# Patient Record
Sex: MALE | Race: WHITE | NOT HISPANIC OR LATINO | Employment: FULL TIME | URBAN - METROPOLITAN AREA
[De-identification: names, ages, dates, MRNs, and addresses within clinical notes are randomized per-mention and may not be internally consistent; named-entity substitution may affect disease eponyms.]

---

## 2017-03-31 ENCOUNTER — OFFICE VISIT (OUTPATIENT)
Dept: FAMILY MEDICINE CLINIC | Facility: CLINIC | Age: 52
End: 2017-03-31

## 2017-03-31 VITALS
BODY MASS INDEX: 36.91 KG/M2 | SYSTOLIC BLOOD PRESSURE: 140 MMHG | TEMPERATURE: 97.4 F | DIASTOLIC BLOOD PRESSURE: 88 MMHG | HEIGHT: 70 IN | HEART RATE: 76 BPM | WEIGHT: 257.8 LBS | RESPIRATION RATE: 20 BRPM

## 2017-03-31 DIAGNOSIS — J40 BRONCHITIS: Primary | ICD-10-CM

## 2017-03-31 PROCEDURE — 99213 OFFICE O/P EST LOW 20 MIN: CPT | Performed by: FAMILY MEDICINE

## 2017-03-31 RX ORDER — METHYLPREDNISOLONE 4 MG/1
TABLET ORAL
Qty: 21 TABLET | Refills: 0 | Status: SHIPPED | OUTPATIENT
Start: 2017-03-31 | End: 2019-05-17

## 2017-03-31 RX ORDER — AZITHROMYCIN 250 MG/1
TABLET, FILM COATED ORAL
Qty: 6 TABLET | Refills: 0 | Status: SHIPPED | OUTPATIENT
Start: 2017-03-31 | End: 2019-05-17

## 2017-03-31 NOTE — PROGRESS NOTES
Subjective   Audie Paul is a 52 y.o. male.     History of Present Illness   Upper Respiratory Infection  Patient complains of symptoms of a URI, possible sinusitis. Symptoms include congestion, lightheadedness, no  fever and non productive cough. Onset of symptoms was 4 days ago, and has been gradually worsening since that time. Treatment to date: DayQuil and NyQuil .    The following portions of the patient's history were reviewed and updated as appropriate: allergies, current medications, past family history, past medical history, past social history, past surgical history and problem list.    Review of Systems   Constitutional: Negative for chills and fever.   HENT: Negative for congestion, postnasal drip and sinus pressure.    Respiratory: Positive for cough. Negative for chest tightness and wheezing.    Cardiovascular: Negative for chest pain.   Gastrointestinal: Negative for diarrhea, nausea and vomiting.   Musculoskeletal: Negative for arthralgias and myalgias.   Skin: Negative for rash.   Neurological: Negative for dizziness and headaches.       Objective   Physical Exam   Constitutional: He is oriented to person, place, and time. He appears well-developed and well-nourished.   HENT:   Head: Normocephalic and atraumatic.   Right Ear: Hearing, tympanic membrane, external ear and ear canal normal.   Left Ear: Hearing, tympanic membrane, external ear and ear canal normal.   Nose: Nose normal. Right sinus exhibits no maxillary sinus tenderness and no frontal sinus tenderness. Left sinus exhibits no maxillary sinus tenderness and no frontal sinus tenderness.   Mouth/Throat: Uvula is midline, oropharynx is clear and moist and mucous membranes are normal.   Eyes: Conjunctivae and EOM are normal.   Neck: Normal range of motion. Neck supple.   Cardiovascular: Normal rate, regular rhythm and normal heart sounds.    Pulmonary/Chest: Effort normal and breath sounds normal. No respiratory distress. He has no  wheezes.   Dry cough     Musculoskeletal: He exhibits no deformity.   Lymphadenopathy:     He has no cervical adenopathy.   Neurological: He is alert and oriented to person, place, and time. No cranial nerve deficit.   Skin: Skin is warm and dry.   Psychiatric: He has a normal mood and affect. His behavior is normal.   Nursing note and vitals reviewed.      Assessment/Plan   Audie was seen today for cough & chest congestion.    Diagnoses and all orders for this visit:    Bronchitis  -     MethylPREDNISolone (MEDROL, ESTELLA,) 4 MG tablet; Take as directed on package instructions.  -     azithromycin (ZITHROMAX) 250 MG tablet; Take 2 tablets the first day, then 1 tablet daily for 4 days.      Bronchitis treated with steroid and Zpak  Advised him to use good hand hygiene and cough mouth while coughing to prevent spread of infection

## 2017-03-31 NOTE — PATIENT INSTRUCTIONS
Go to the nearest ER or return to clinic if symptoms worsen, fever/chill develop      Acute Bronchitis  Bronchitis is when the airways that extend from the windpipe into the lungs get red, puffy, and painful (inflamed). Bronchitis often causes thick spit (mucus) to develop. This leads to a cough. A cough is the most common symptom of bronchitis.  In acute bronchitis, the condition usually begins suddenly and goes away over time (usually in 2 weeks). Smoking, allergies, and asthma can make bronchitis worse. Repeated episodes of bronchitis may cause more lung problems.  HOME CARE  · Rest.  · Drink enough fluids to keep your pee (urine) clear or pale yellow (unless you need to limit fluids as told by your doctor).  · Only take over-the-counter or prescription medicines as told by your doctor.  · Avoid smoking and secondhand smoke. These can make bronchitis worse. If you are a smoker, think about using nicotine gum or skin patches. Quitting smoking will help your lungs heal faster.  · Reduce the chance of getting bronchitis again by:    Washing your hands often.    Avoiding people with cold symptoms.    Trying not to touch your hands to your mouth, nose, or eyes.  · Follow up with your doctor as told.  GET HELP IF:  Your symptoms do not improve after 1 week of treatment. Symptoms include:  · Cough.  · Fever.  · Coughing up thick spit.  · Body aches.  · Chest congestion.  · Chills.  · Shortness of breath.  · Sore throat.  GET HELP RIGHT AWAY IF:   · You have an increased fever.  · You have chills.  · You have severe shortness of breath.  · You have bloody thick spit (sputum).  · You throw up (vomit) often.  · You lose too much body fluid (dehydration).  · You have a severe headache.  · You faint.  MAKE SURE YOU:   · Understand these instructions.  · Will watch your condition.  · Will get help right away if you are not doing well or get worse.     This information is not intended to replace advice given to you by your  health care provider. Make sure you discuss any questions you have with your health care provider.     Document Released: 06/05/2009 Document Revised: 08/20/2014 Document Reviewed: 06/10/2014  Elsevier Interactive Patient Education ©2016 [employer]

## 2019-05-17 ENCOUNTER — RESULTS ENCOUNTER (OUTPATIENT)
Dept: FAMILY MEDICINE CLINIC | Facility: CLINIC | Age: 54
End: 2019-05-17

## 2019-05-17 ENCOUNTER — OFFICE VISIT (OUTPATIENT)
Dept: FAMILY MEDICINE CLINIC | Facility: CLINIC | Age: 54
End: 2019-05-17

## 2019-05-17 VITALS
SYSTOLIC BLOOD PRESSURE: 162 MMHG | HEIGHT: 71 IN | RESPIRATION RATE: 16 BRPM | DIASTOLIC BLOOD PRESSURE: 102 MMHG | TEMPERATURE: 97.7 F | BODY MASS INDEX: 36.82 KG/M2 | WEIGHT: 263 LBS | HEART RATE: 72 BPM

## 2019-05-17 DIAGNOSIS — Z12.11 SCREEN FOR COLON CANCER: ICD-10-CM

## 2019-05-17 DIAGNOSIS — Z00.00 WELL ADULT EXAM: Primary | ICD-10-CM

## 2019-05-17 DIAGNOSIS — I10 ESSENTIAL HYPERTENSION: ICD-10-CM

## 2019-05-17 DIAGNOSIS — R13.14 PHARYNGOESOPHAGEAL DYSPHAGIA: ICD-10-CM

## 2019-05-17 DIAGNOSIS — Z12.5 SCREENING FOR PROSTATE CANCER: ICD-10-CM

## 2019-05-17 PROCEDURE — 99396 PREV VISIT EST AGE 40-64: CPT | Performed by: FAMILY MEDICINE

## 2019-05-17 RX ORDER — LISINOPRIL AND HYDROCHLOROTHIAZIDE 25; 20 MG/1; MG/1
1 TABLET ORAL DAILY
Qty: 30 TABLET | Refills: 5 | Status: SHIPPED | OUTPATIENT
Start: 2019-05-17 | End: 2021-09-10

## 2019-05-18 LAB
ALBUMIN SERPL-MCNC: 4.3 G/DL (ref 3.5–5.2)
ALBUMIN/GLOB SERPL: 1.7 G/DL
ALP SERPL-CCNC: 85 U/L (ref 39–117)
ALT SERPL-CCNC: 23 U/L (ref 1–41)
AST SERPL-CCNC: 17 U/L (ref 1–40)
BASOPHILS # BLD AUTO: 0.06 10*3/MM3 (ref 0–0.2)
BASOPHILS NFR BLD AUTO: 0.7 % (ref 0–1.5)
BILIRUB SERPL-MCNC: 0.2 MG/DL (ref 0.2–1.2)
BUN SERPL-MCNC: 11 MG/DL (ref 6–20)
BUN/CREAT SERPL: 13.4 (ref 7–25)
CALCIUM SERPL-MCNC: 9.8 MG/DL (ref 8.6–10.5)
CHLORIDE SERPL-SCNC: 105 MMOL/L (ref 98–107)
CHOLEST SERPL-MCNC: 161 MG/DL (ref 0–200)
CO2 SERPL-SCNC: 27.8 MMOL/L (ref 22–29)
CREAT SERPL-MCNC: 0.82 MG/DL (ref 0.76–1.27)
EOSINOPHIL # BLD AUTO: 0.24 10*3/MM3 (ref 0–0.4)
EOSINOPHIL NFR BLD AUTO: 2.7 % (ref 0.3–6.2)
ERYTHROCYTE [DISTWIDTH] IN BLOOD BY AUTOMATED COUNT: 14.1 % (ref 12.3–15.4)
GLOBULIN SER CALC-MCNC: 2.5 GM/DL
GLUCOSE SERPL-MCNC: 108 MG/DL (ref 65–99)
HCT VFR BLD AUTO: 50.1 % (ref 37.5–51)
HDLC SERPL-MCNC: 40 MG/DL (ref 40–60)
HGB BLD-MCNC: 15.7 G/DL (ref 13–17.7)
IMM GRANULOCYTES # BLD AUTO: 0.05 10*3/MM3 (ref 0–0.05)
IMM GRANULOCYTES NFR BLD AUTO: 0.6 % (ref 0–0.5)
LDLC SERPL CALC-MCNC: 98 MG/DL (ref 0–100)
LYMPHOCYTES # BLD AUTO: 1.97 10*3/MM3 (ref 0.7–3.1)
LYMPHOCYTES NFR BLD AUTO: 22.5 % (ref 19.6–45.3)
MCH RBC QN AUTO: 28.1 PG (ref 26.6–33)
MCHC RBC AUTO-ENTMCNC: 31.3 G/DL (ref 31.5–35.7)
MCV RBC AUTO: 89.6 FL (ref 79–97)
MONOCYTES # BLD AUTO: 0.77 10*3/MM3 (ref 0.1–0.9)
MONOCYTES NFR BLD AUTO: 8.8 % (ref 5–12)
NEUTROPHILS # BLD AUTO: 5.65 10*3/MM3 (ref 1.7–7)
NEUTROPHILS NFR BLD AUTO: 64.7 % (ref 42.7–76)
NRBC BLD AUTO-RTO: 0 /100 WBC (ref 0–0.2)
PLATELET # BLD AUTO: 255 10*3/MM3 (ref 140–450)
POTASSIUM SERPL-SCNC: 4.7 MMOL/L (ref 3.5–5.2)
PROT SERPL-MCNC: 6.8 G/DL (ref 6–8.5)
PSA SERPL-MCNC: 0.46 NG/ML (ref 0–4)
RBC # BLD AUTO: 5.59 10*6/MM3 (ref 4.14–5.8)
SODIUM SERPL-SCNC: 142 MMOL/L (ref 136–145)
TRIGL SERPL-MCNC: 117 MG/DL (ref 0–150)
VLDLC SERPL CALC-MCNC: 23.4 MG/DL
WBC # BLD AUTO: 8.74 10*3/MM3 (ref 3.4–10.8)

## 2019-05-28 ENCOUNTER — TELEPHONE (OUTPATIENT)
Dept: FAMILY MEDICINE CLINIC | Facility: CLINIC | Age: 54
End: 2019-05-28

## 2019-05-28 NOTE — TELEPHONE ENCOUNTER
----- Message from Regina Celis sent at 5/28/2019 10:21 AM EDT -----  Contact: DR LUNDY   PATIENT IS HAVING FROM THE lisinopril-hydrochlorothiazide (PRINZIDE,ZESTORETIC) 20-25 MG per tablet. PATIENT FEELS VERY LIGHTHEADED AND VERY NAUSEOUS WHEN HE EATS. IT HAS HELPED WITH HIS BP. WHAT SHOULD HE DO?  [phone]

## 2020-02-13 ENCOUNTER — OFFICE VISIT (OUTPATIENT)
Dept: FAMILY MEDICINE CLINIC | Facility: CLINIC | Age: 55
End: 2020-02-13

## 2020-02-13 VITALS
TEMPERATURE: 97.7 F | RESPIRATION RATE: 18 BRPM | WEIGHT: 268.6 LBS | HEART RATE: 80 BPM | HEIGHT: 71 IN | BODY MASS INDEX: 37.6 KG/M2 | DIASTOLIC BLOOD PRESSURE: 86 MMHG | SYSTOLIC BLOOD PRESSURE: 126 MMHG

## 2020-02-13 DIAGNOSIS — R52 BODY ACHES: ICD-10-CM

## 2020-02-13 DIAGNOSIS — J02.9 SORE THROAT: ICD-10-CM

## 2020-02-13 DIAGNOSIS — J06.9 URI, ACUTE: Primary | ICD-10-CM

## 2020-02-13 LAB
EXPIRATION DATE: NORMAL
EXPIRATION DATE: NORMAL
FLUAV AG NPH QL: NEGATIVE
FLUBV AG NPH QL: NEGATIVE
INTERNAL CONTROL: NORMAL
INTERNAL CONTROL: NORMAL
Lab: NORMAL
Lab: NORMAL
S PYO AG THROAT QL: NEGATIVE

## 2020-02-13 PROCEDURE — 87880 STREP A ASSAY W/OPTIC: CPT | Performed by: PHYSICIAN ASSISTANT

## 2020-02-13 PROCEDURE — 99213 OFFICE O/P EST LOW 20 MIN: CPT | Performed by: PHYSICIAN ASSISTANT

## 2020-02-13 PROCEDURE — 87804 INFLUENZA ASSAY W/OPTIC: CPT | Performed by: PHYSICIAN ASSISTANT

## 2020-02-13 NOTE — PROGRESS NOTES
"Subjective   Audie Paul is a 54 y.o. male.   Chief Complaint   Patient presents with   • URI     congestion, cough, some aching, Sore throat, drainage, no fever. Started yesterday. Cold chills last night.       History of Present Illness   Pt presents with CC of cough, congestion, aching, sore throat, drainage, chills. Symptoms started last night   No fever.   No known sick contacts   Did not get flu shot this year     The following portions of the patient's history were reviewed and updated as appropriate: allergies, current medications, past family history, past medical history, past social history, past surgical history and problem list.    Review of Systems   Constitutional: Positive for chills and fatigue. Negative for diaphoresis and fever.   HENT: Positive for congestion, postnasal drip and sore throat. Negative for ear discharge, ear pain, hearing loss, nosebleeds, sinus pressure and sneezing.    Eyes: Negative.    Respiratory: Positive for cough. Negative for chest tightness, shortness of breath and wheezing.    Cardiovascular: Negative.  Negative for chest pain, palpitations and leg swelling.   Gastrointestinal: Negative for abdominal distention, abdominal pain, blood in stool, constipation, diarrhea, nausea and vomiting.   Genitourinary: Negative.  Negative for difficulty urinating, dysuria, flank pain, frequency, hematuria and urgency.   Musculoskeletal: Positive for myalgias. Negative for arthralgias, back pain, gait problem, joint swelling, neck pain and neck stiffness.   Skin: Negative.  Negative for color change, pallor, rash and wound.   Neurological: Negative for syncope, weakness, numbness and headaches.       Objective    Blood pressure 126/86, pulse 80, temperature 97.7 °F (36.5 °C), resp. rate 18, height 179.1 cm (70.5\"), weight 122 kg (268 lb 9.6 oz).     Physical Exam   Constitutional: He is oriented to person, place, and time. He appears well-developed and well-nourished.   HENT: "   Head: Normocephalic and atraumatic.   Right Ear: Tympanic membrane, external ear and ear canal normal.   Left Ear: Tympanic membrane, external ear and ear canal normal.   Nose: Mucosal edema and rhinorrhea present. Right sinus exhibits no maxillary sinus tenderness and no frontal sinus tenderness. Left sinus exhibits no maxillary sinus tenderness and no frontal sinus tenderness.   Mouth/Throat: Oropharynx is clear and moist. No oropharyngeal exudate, posterior oropharyngeal edema or posterior oropharyngeal erythema.   Eyes: Conjunctivae are normal.   Neck: Normal range of motion. Neck supple. No tracheal deviation present. No thyromegaly present.   Cardiovascular: Normal rate, regular rhythm and normal heart sounds.   Pulmonary/Chest: Effort normal and breath sounds normal. No respiratory distress. He has no wheezes. He has no rales. He exhibits no tenderness.   Lymphadenopathy:     He has no cervical adenopathy.   Neurological: He is alert and oriented to person, place, and time.   Skin: Skin is warm and dry.   Psychiatric: He has a normal mood and affect. His behavior is normal. Judgment and thought content normal.   Nursing note and vitals reviewed.      Assessment/Plan   Audie was seen today for uri.    Diagnoses and all orders for this visit:    URI, acute    Sore throat  -     POCT Influenza A/B    Body aches  -     POCT rapid strep A    flu A and B negative. RST negative. Pt aware. Symptoms likely viral. Symptomatic treatment as discussed. Call if not improving. Pt agrees.

## 2020-02-17 NOTE — PROGRESS NOTES
I have reviewed the notes, assessments, and/or procedures performed by JOSI Saldana, I concur with her/his documentation of Audie Paul.

## 2021-09-10 ENCOUNTER — OFFICE VISIT (OUTPATIENT)
Dept: FAMILY MEDICINE CLINIC | Facility: CLINIC | Age: 56
End: 2021-09-10

## 2021-09-10 VITALS
DIASTOLIC BLOOD PRESSURE: 84 MMHG | BODY MASS INDEX: 38.94 KG/M2 | RESPIRATION RATE: 18 BRPM | SYSTOLIC BLOOD PRESSURE: 124 MMHG | TEMPERATURE: 98.6 F | WEIGHT: 272 LBS | HEIGHT: 70 IN | HEART RATE: 78 BPM

## 2021-09-10 DIAGNOSIS — M77.8 LEFT SHOULDER TENDONITIS: ICD-10-CM

## 2021-09-10 DIAGNOSIS — M25.512 ACUTE PAIN OF LEFT SHOULDER: Primary | ICD-10-CM

## 2021-09-10 PROCEDURE — 99213 OFFICE O/P EST LOW 20 MIN: CPT | Performed by: FAMILY MEDICINE

## 2021-09-10 RX ORDER — NAPROXEN 500 MG/1
500 TABLET ORAL 2 TIMES DAILY WITH MEALS
Qty: 60 TABLET | Refills: 0 | Status: SHIPPED | OUTPATIENT
Start: 2021-09-10 | End: 2022-06-21

## 2021-09-10 NOTE — PROGRESS NOTES
Subjective   Audie Paul is a 56 y.o. male.     History of Present Illness     He has had some left shoulder pain  End of July he was at work and he lifted his arm up and he felt a pop  Since that time he has had left shoulder pain  He went to [facility] and had an XR done of his left shoulder.  XR looked like arthritis  He is going to start PT  He is not on any NSAIDs  Pain is nonstop and hurts all the time      The following portions of the patient's history were reviewed and updated as appropriate: allergies, current medications, past family history, past medical history, past social history, past surgical history and problem list.    Review of Systems   Constitutional: Negative.    Musculoskeletal:        Hpi       Objective   Physical Exam  Vitals and nursing note reviewed.   Constitutional:       General: He is not in acute distress.     Appearance: Normal appearance. He is well-developed.   Cardiovascular:      Rate and Rhythm: Normal rate and regular rhythm.      Heart sounds: Normal heart sounds.   Pulmonary:      Effort: Pulmonary effort is normal.      Breath sounds: Normal breath sounds.   Musculoskeletal:        Arms:    Neurological:      Mental Status: He is alert and oriented to person, place, and time.   Psychiatric:         Mood and Affect: Mood normal.         Behavior: Behavior normal.         Thought Content: Thought content normal.         Judgment: Judgment normal.         Assessment/Plan   Diagnoses and all orders for this visit:    1. Acute pain of left shoulder (Primary)    2. Left shoulder tendonitis  -     naproxen (Naprosyn) 500 MG tablet; Take 1 tablet by mouth 2 (Two) Times a Day With Meals.  Dispense: 60 tablet; Refill: 0    likely tendonitis, will work on PT for shoulder pain as well as NSAID.  Consider ortho if not improving in the next 2 weeks.  Will request XR results

## 2022-06-21 ENCOUNTER — OFFICE VISIT (OUTPATIENT)
Dept: FAMILY MEDICINE CLINIC | Facility: CLINIC | Age: 57
End: 2022-06-21

## 2022-06-21 VITALS
TEMPERATURE: 98.4 F | HEART RATE: 78 BPM | BODY MASS INDEX: 38.65 KG/M2 | WEIGHT: 270 LBS | RESPIRATION RATE: 18 BRPM | DIASTOLIC BLOOD PRESSURE: 92 MMHG | SYSTOLIC BLOOD PRESSURE: 144 MMHG | HEIGHT: 70 IN

## 2022-06-21 DIAGNOSIS — I10 ESSENTIAL HYPERTENSION: Primary | ICD-10-CM

## 2022-06-21 DIAGNOSIS — M25.561 ACUTE PAIN OF RIGHT KNEE: ICD-10-CM

## 2022-06-21 DIAGNOSIS — Z12.5 PROSTATE CANCER SCREENING: ICD-10-CM

## 2022-06-21 DIAGNOSIS — Z12.11 COLON CANCER SCREENING: ICD-10-CM

## 2022-06-21 PROCEDURE — 99214 OFFICE O/P EST MOD 30 MIN: CPT | Performed by: FAMILY MEDICINE

## 2022-06-21 RX ORDER — LISINOPRIL 10 MG/1
10 TABLET ORAL DAILY
Qty: 90 TABLET | Refills: 0 | Status: SHIPPED | OUTPATIENT
Start: 2022-06-21 | End: 2022-09-16

## 2022-06-21 NOTE — PROGRESS NOTES
"Chief Complaint  Hypertension and Knee Pain (Right )    Subjective          Audie Paul presents to [facility]  History of Present Illness    Essential hypertension  The patient reports that he has had issues with elevated blood pressure over the years. He has been told many times that his blood pressure is high including most recently on 2022 when he had a left rotator cuff repair. The patient was at  earlier this morning where his blood pressure was 161/83 mmHg.     He denies any headaches, chest pain, or shortness of breath other than with exertion. He states that he is still off work right now from his rotator cuff surgery. He does have a family history of high blood pressure in his father who  a while back; however, he was 80 years old. He states that his mother is in perfect health. He denies smoking but endorses caffeine in the form of 4 to 5 Cokes a day. He denies energy drinks. He denies illicit drugs such as cocaine or methamphetamine. The patient has been on lisinopril/hydrochlorothiazide in the past, which he states made him feel \"weird.\" He does recall having a cough with that regimen.     Right knee pain  The patient reports a knot on his right knee that has been present for many years; however, he denies any pain or difficulty with it. He states that over the last couple of weeks, he has had a sensation of something moving in the posterior knee with occasional pain and associated popping with going down stairs. He has applied ice to the area. He denies any falls or instability. He does have pain with prolonged walking.     Review of Systems   A review of systems was performed, and the pertinent positives are noted in the HPI.     Objective       Vital Signs:   /92   Pulse 78   Temp 98.4 °F (36.9 °C)   Resp 18   Ht 177.8 cm (70\")   Wt 122 kg (270 lb)   BMI 38.74 kg/m²     Physical Exam  Vitals and nursing note reviewed.   Constitutional:     "   General: He is not in acute distress.     Appearance: Normal appearance. He is well-developed. He is not ill-appearing.   HENT:      Head: Normocephalic and atraumatic.      Right Ear: Hearing, tympanic membrane, ear canal and external ear normal.      Left Ear: Hearing, tympanic membrane, ear canal and external ear normal.      Nose: Nose normal. No congestion or rhinorrhea.      Mouth/Throat:      Mouth: Mucous membranes are moist.      Pharynx: No oropharyngeal exudate or posterior oropharyngeal erythema.   Eyes:      General: Lids are normal.      Conjunctiva/sclera: Conjunctivae normal.      Pupils: Pupils are equal, round, and reactive to light.   Neck:      Thyroid: No thyromegaly.   Cardiovascular:      Rate and Rhythm: Normal rate and regular rhythm.      Heart sounds: Normal heart sounds. No murmur heard.    No friction rub.   Pulmonary:      Effort: Pulmonary effort is normal. No respiratory distress.      Breath sounds: Normal breath sounds. No wheezing or rales.   Abdominal:      General: Bowel sounds are normal. There is no distension.      Palpations: Abdomen is soft. There is no mass.      Tenderness: There is no abdominal tenderness. There is no guarding or rebound.   Musculoskeletal:      Right lower leg: No edema.      Left lower leg: No edema.   Skin:     General: Skin is warm and dry.   Neurological:      General: No focal deficit present.      Mental Status: He is alert.   Psychiatric:         Mood and Affect: Mood normal.         Speech: Speech normal.         Behavior: Behavior normal.          Result Review :                     Assessment and Plan    Diagnoses and all orders for this visit:    1. Essential hypertension (Primary)  -     CBC & Differential; Future  -     Comprehensive Metabolic Panel; Future  -     Lipid Panel With / Chol / HDL Ratio; Future  -     lisinopril (PRINIVIL,ZESTRIL) 10 MG tablet; Take 1 tablet by mouth Daily.  Dispense: 90 tablet; Refill: 0  - We will start  lisinopril 10 mg 1 daily. He previously was on lisinopril-hydrochlorothiazide several years ago, and it may have caused his blood pressure to decrease too much. We will just try plain lisinopril. Check labs including CMP, CBC, and lipid panel. He will follow up and do that fasting later this week.    2. Acute pain of right knee       - Examination is benign. No obvious abnormality is seen. He does have a tibial tuberosity enlargement but nontender. Recommend ice and rest, and if it is not improving, he will let us know.    3. Prostate cancer screening  -     PSA Screen; Future  - We will check PSA.    4. Colon cancer screening  -     Cologuard - Stool, Per Rectum; Future  - He agrees to do Cologuard and is aware that he would have to have a colonoscopy if that is abnormal. He has no family history of colon cancer. He has had no personal history of polyps and no bleeding.          DISCUSSION  .     Follow Up   Return in about 1 month (around 7/21/2022).    Patient was given instructions and counseling regarding his condition or for health maintenance advice. Please see specific information pulled into the AVS if appropriate.       Ilia Mrax MD     Transcribed from ambient dictation for Ilia Marx MD by Karen Gibbs.  06/21/22   15:10 EDT    Patient verbalized consent to the visit recording.  I have personally performed the services described in this document as transcribed by the above individual, and it is both accurate and complete.  Ilia Marx MD  6/23/2022  18:32 EDT

## 2022-06-23 DIAGNOSIS — I10 ESSENTIAL HYPERTENSION: ICD-10-CM

## 2022-06-23 DIAGNOSIS — Z12.5 PROSTATE CANCER SCREENING: ICD-10-CM

## 2022-06-23 LAB
ALBUMIN SERPL-MCNC: 4.5 G/DL (ref 3.5–5.2)
ALBUMIN/GLOB SERPL: 2 G/DL
ALP SERPL-CCNC: 90 U/L (ref 39–117)
ALT SERPL-CCNC: 20 U/L (ref 1–41)
AST SERPL-CCNC: 18 U/L (ref 1–40)
BASOPHILS # BLD AUTO: 0.05 10*3/MM3 (ref 0–0.2)
BASOPHILS NFR BLD AUTO: 0.5 % (ref 0–1.5)
BILIRUB SERPL-MCNC: 0.5 MG/DL (ref 0–1.2)
BUN SERPL-MCNC: 10 MG/DL (ref 6–20)
BUN/CREAT SERPL: 10.3 (ref 7–25)
CALCIUM SERPL-MCNC: 9 MG/DL (ref 8.6–10.5)
CHLORIDE SERPL-SCNC: 101 MMOL/L (ref 98–107)
CHOLEST SERPL-MCNC: 168 MG/DL (ref 0–200)
CHOLEST/HDLC SERPL: 4.31 {RATIO}
CO2 SERPL-SCNC: 23.6 MMOL/L (ref 22–29)
CREAT SERPL-MCNC: 0.97 MG/DL (ref 0.76–1.27)
EGFRCR SERPLBLD CKD-EPI 2021: 91.6 ML/MIN/1.73
EOSINOPHIL # BLD AUTO: 0.2 10*3/MM3 (ref 0–0.4)
EOSINOPHIL NFR BLD AUTO: 2 % (ref 0.3–6.2)
ERYTHROCYTE [DISTWIDTH] IN BLOOD BY AUTOMATED COUNT: 13.1 % (ref 12.3–15.4)
GLOBULIN SER CALC-MCNC: 2.3 GM/DL
GLUCOSE SERPL-MCNC: 120 MG/DL (ref 65–99)
HCT VFR BLD AUTO: 47.4 % (ref 37.5–51)
HDLC SERPL-MCNC: 39 MG/DL (ref 40–60)
HGB BLD-MCNC: 16 G/DL (ref 13–17.7)
IMM GRANULOCYTES # BLD AUTO: 0.05 10*3/MM3 (ref 0–0.05)
IMM GRANULOCYTES NFR BLD AUTO: 0.5 % (ref 0–0.5)
LDLC SERPL CALC-MCNC: 105 MG/DL (ref 0–100)
LYMPHOCYTES # BLD AUTO: 1.72 10*3/MM3 (ref 0.7–3.1)
LYMPHOCYTES NFR BLD AUTO: 17.3 % (ref 19.6–45.3)
MCH RBC QN AUTO: 28.3 PG (ref 26.6–33)
MCHC RBC AUTO-ENTMCNC: 33.8 G/DL (ref 31.5–35.7)
MCV RBC AUTO: 83.7 FL (ref 79–97)
MONOCYTES # BLD AUTO: 0.7 10*3/MM3 (ref 0.1–0.9)
MONOCYTES NFR BLD AUTO: 7 % (ref 5–12)
NEUTROPHILS # BLD AUTO: 7.24 10*3/MM3 (ref 1.7–7)
NEUTROPHILS NFR BLD AUTO: 72.7 % (ref 42.7–76)
NRBC BLD AUTO-RTO: 0 /100 WBC (ref 0–0.2)
PLATELET # BLD AUTO: 302 10*3/MM3 (ref 140–450)
POTASSIUM SERPL-SCNC: 4.1 MMOL/L (ref 3.5–5.2)
PROT SERPL-MCNC: 6.8 G/DL (ref 6–8.5)
PSA SERPL-MCNC: 0.43 NG/ML (ref 0–4)
RBC # BLD AUTO: 5.66 10*6/MM3 (ref 4.14–5.8)
SODIUM SERPL-SCNC: 139 MMOL/L (ref 136–145)
TRIGL SERPL-MCNC: 131 MG/DL (ref 0–150)
VLDLC SERPL CALC-MCNC: 24 MG/DL (ref 5–40)
WBC # BLD AUTO: 9.96 10*3/MM3 (ref 3.4–10.8)

## 2022-06-29 LAB
HBA1C MFR BLD: NORMAL %
Lab: NORMAL
REQUEST PROBLEM: NORMAL
WRITTEN AUTHORIZATION: NORMAL

## 2022-07-26 ENCOUNTER — OFFICE VISIT (OUTPATIENT)
Dept: FAMILY MEDICINE CLINIC | Facility: CLINIC | Age: 57
End: 2022-07-26

## 2022-07-26 VITALS
DIASTOLIC BLOOD PRESSURE: 74 MMHG | SYSTOLIC BLOOD PRESSURE: 122 MMHG | HEART RATE: 70 BPM | TEMPERATURE: 98 F | BODY MASS INDEX: 37.8 KG/M2 | RESPIRATION RATE: 18 BRPM | WEIGHT: 264 LBS | HEIGHT: 70 IN

## 2022-07-26 DIAGNOSIS — R73.9 HYPERGLYCEMIA: ICD-10-CM

## 2022-07-26 DIAGNOSIS — I10 ESSENTIAL HYPERTENSION: Primary | ICD-10-CM

## 2022-07-26 PROCEDURE — 99213 OFFICE O/P EST LOW 20 MIN: CPT | Performed by: FAMILY MEDICINE

## 2022-07-26 NOTE — PROGRESS NOTES
"Chief Complaint  Hypertension (1 month f/u )    Subjective          Audie Paul presents to [facility]  History of Present Illness  Hypertension  The patient states he is feeling well today. He has been monitoring his blood pressure at home and states it is normally about 130 mmHg systolic. He denies any chest pain or shortness of breath. He is currently taking lisinopril 10 mg and has not noticed any side effects. He has lost 6 lbs since last visit which he attributes to decreasing soda consumption and running on his treadmill. He denies any family history of diabetes.     Review of Systems   Constitutional: Negative.    HENT: Negative.    Respiratory: Negative.    Cardiovascular: Negative.         Objective       Vital Signs:   /74   Pulse 70   Temp 98 °F (36.7 °C)   Resp 18   Ht 177.8 cm (70\")   Wt 120 kg (264 lb)   BMI 37.88 kg/m²     Physical Exam  Vitals and nursing note reviewed.   Constitutional:       Appearance: He is well-developed.   HENT:      Head: Normocephalic and atraumatic.   Eyes:      Pupils: Pupils are equal, round, and reactive to light.   Cardiovascular:      Rate and Rhythm: Normal rate and regular rhythm.      Heart sounds: Normal heart sounds. No murmur heard.  Pulmonary:      Effort: Pulmonary effort is normal. No respiratory distress.      Breath sounds: Normal breath sounds. No wheezing or rales.   Skin:     General: Skin is warm and dry.   Neurological:      Mental Status: He is alert.   Psychiatric:         Mood and Affect: Mood normal.         Behavior: Behavior normal.          Result Review :                     Assessment and Plan    Diagnoses and all orders for this visit:    1. Essential hypertension (Primary)  Assessment & Plan:  The patient's blood pressure is doing much better on lisinopril 10 mg daily. We will continue this dose. He will follow up and recheck in 3 months. He will continue to monitor at home and call with any " side effects or if blood pressure is increasing.      2. Hyperglycemia  Comments:  The patient's blood sugar is mildly elevated at 120. We will check an A1c today. Since last visit, he has been drinking Coke Zero instead of regular Coke  Orders:  -     Hemoglobin A1c       Ilia Marx MD    DISCUSSION      Follow Up   Return in about 3 months (around 10/26/2022).    Patient was given instructions and counseling regarding his condition or for health maintenance advice. Please see specific information pulled into the AVS if appropriate.       Ilia Marx MD

## 2022-07-26 NOTE — ASSESSMENT & PLAN NOTE
The patient's blood pressure is doing much better on lisinopril 10 mg daily. We will continue this dose. He will follow up and recheck in 3 months. He will continue to monitor at home and call with any side effects or if blood pressure is increasing.

## 2022-07-27 LAB — HBA1C MFR BLD: 6.8 % (ref 4.8–5.6)

## 2022-07-29 PROBLEM — E11.9 TYPE 2 DIABETES MELLITUS, WITHOUT LONG-TERM CURRENT USE OF INSULIN (HCC): Status: ACTIVE | Noted: 2022-07-29

## 2022-09-16 DIAGNOSIS — I10 ESSENTIAL HYPERTENSION: ICD-10-CM

## 2022-09-16 RX ORDER — LISINOPRIL 10 MG/1
TABLET ORAL
Qty: 90 TABLET | Refills: 0 | Status: SHIPPED | OUTPATIENT
Start: 2022-09-16 | End: 2022-12-29

## 2022-12-29 DIAGNOSIS — I10 ESSENTIAL HYPERTENSION: ICD-10-CM

## 2022-12-29 RX ORDER — LISINOPRIL 10 MG/1
TABLET ORAL
Qty: 90 TABLET | Refills: 0 | Status: SHIPPED | OUTPATIENT
Start: 2022-12-29 | End: 2023-04-03

## 2023-04-01 DIAGNOSIS — I10 ESSENTIAL HYPERTENSION: ICD-10-CM

## 2023-04-03 RX ORDER — LISINOPRIL 10 MG/1
10 TABLET ORAL DAILY
Qty: 90 TABLET | Refills: 0 | Status: SHIPPED | OUTPATIENT
Start: 2023-04-03

## 2023-06-22 ENCOUNTER — TELEPHONE (OUTPATIENT)
Dept: FAMILY MEDICINE CLINIC | Facility: CLINIC | Age: 58
End: 2023-06-22

## 2023-06-22 NOTE — TELEPHONE ENCOUNTER
Caller: Hattie Paul    Relationship: Emergency Contact    Best call back number: [phone]     Who is your current provider: MD LUNDY     Who would you like your new provider to be: MD BAUTISTA     What are your reasons for transferring care: PATIENTS WIFE STATES HE HAS SEEN MD BAUTISTA IN THE PAST AND HE IS CONSIDERED THE FAMILY DOCTOR.     Additional notes: PATIENTS WIFE IS TRYING TO GET AN APPOINTMENT FOR THE PATIENT ON 6.30.23 IN THE MORNING FOR REFILLS. SHE STATES THE PATIENT IS REQUESTING TO HAVE THAT APPOINTMENT WITH MD BAUTISTA.     PLEASE CALL BACK TO DISCUSS THE CHANGE IN PRIMARY CARE PROVIDER AND TO SCHEDULE THE APPOINTMENT FOR REFILLS IF POSSIBLE, IF NO ANSWER LEAVE A MESSAGE.

## 2023-06-22 NOTE — TELEPHONE ENCOUNTER
Are you willing to see pt if you have an opening for fu on his BP?  If so, please forward to the front office so they can call him to schedule.

## 2023-10-09 DIAGNOSIS — I10 ESSENTIAL HYPERTENSION: ICD-10-CM

## 2023-10-09 RX ORDER — LISINOPRIL 10 MG/1
10 TABLET ORAL DAILY
Qty: 90 TABLET | Refills: 0 | Status: SHIPPED | OUTPATIENT
Start: 2023-10-09

## 2024-01-25 DIAGNOSIS — I10 ESSENTIAL HYPERTENSION: ICD-10-CM

## 2024-01-25 RX ORDER — LISINOPRIL 10 MG/1
10 TABLET ORAL DAILY
Qty: 90 TABLET | Refills: 0 | Status: SHIPPED | OUTPATIENT
Start: 2024-01-25

## 2024-05-09 ENCOUNTER — TELEPHONE (OUTPATIENT)
Dept: FAMILY MEDICINE CLINIC | Facility: CLINIC | Age: 59
End: 2024-05-09
Payer: COMMERCIAL

## 2024-05-09 DIAGNOSIS — I10 ESSENTIAL HYPERTENSION: ICD-10-CM

## 2024-05-10 RX ORDER — LISINOPRIL 10 MG/1
10 TABLET ORAL DAILY
Qty: 90 TABLET | Refills: 0 | OUTPATIENT
Start: 2024-05-10

## 2024-05-13 RX ORDER — LISINOPRIL 10 MG/1
10 TABLET ORAL DAILY
Qty: 30 TABLET | Refills: 0 | Status: SHIPPED | OUTPATIENT
Start: 2024-05-13

## 2024-05-20 ENCOUNTER — OFFICE VISIT (OUTPATIENT)
Dept: FAMILY MEDICINE CLINIC | Facility: CLINIC | Age: 59
End: 2024-05-20
Payer: COMMERCIAL

## 2024-05-20 VITALS
BODY MASS INDEX: 37.65 KG/M2 | HEART RATE: 78 BPM | SYSTOLIC BLOOD PRESSURE: 134 MMHG | DIASTOLIC BLOOD PRESSURE: 82 MMHG | HEIGHT: 70 IN | RESPIRATION RATE: 18 BRPM | TEMPERATURE: 97.5 F | WEIGHT: 263 LBS

## 2024-05-20 DIAGNOSIS — E11.9 TYPE 2 DIABETES MELLITUS WITHOUT COMPLICATION, WITHOUT LONG-TERM CURRENT USE OF INSULIN: ICD-10-CM

## 2024-05-20 DIAGNOSIS — I10 ESSENTIAL HYPERTENSION: Primary | ICD-10-CM

## 2024-05-20 DIAGNOSIS — M25.561 RIGHT KNEE PAIN, UNSPECIFIED CHRONICITY: ICD-10-CM

## 2024-05-20 PROCEDURE — 99214 OFFICE O/P EST MOD 30 MIN: CPT | Performed by: FAMILY MEDICINE

## 2024-05-20 RX ORDER — LISINOPRIL 10 MG/1
10 TABLET ORAL DAILY
Qty: 90 TABLET | Refills: 1 | Status: SHIPPED | OUTPATIENT
Start: 2024-05-20

## 2024-05-20 NOTE — PROGRESS NOTES
"Chief Complaint  Hypertension (F/u )    Subjective          Audie Pual presents to [facility]    History of Present Illness  The patient is here for follow-up.    The patient reports experiencing pain in his right knee, which occurs biannually. He describes a pulling sensation in the posterior aspect of the knee while descending stairs, followed by generalized soreness, which typically resolves within a few months. He also notes a limp upon rising after prolonged periods of sitting, which improves after a few steps.    The patient's blood pressure is recorded at 134/82. He is currently on a regimen of lisinopril. He denies experiencing any chest discomfort, dyspnea, headaches, dizziness, or syncope.    The patient does not monitor his blood glucose levels at home. He denies experiencing any hypoglycemic symptoms such as shakiness or perspiration.        OTHER NOTES:        Review of Systems   Eyes:  Negative for blurred vision.   Respiratory:  Negative for shortness of breath.    Cardiovascular:  Negative for chest pain and palpitations.   All other systems reviewed and are negative.       Objective       Vital Signs:   /82   Pulse 78   Temp 97.5 °F (36.4 °C)   Resp 18   Ht 177.8 cm (70\")   Wt 119 kg (263 lb)   BMI 37.74 kg/m²     Physical Exam  Vitals and nursing note reviewed.   Constitutional:       General: He is not in acute distress.     Appearance: Normal appearance. He is well-developed. He is not ill-appearing.   HENT:      Head: Normocephalic and atraumatic.      Right Ear: Hearing, tympanic membrane, ear canal and external ear normal.      Left Ear: Hearing, tympanic membrane, ear canal and external ear normal.      Nose: Nose normal. No congestion or rhinorrhea.      Mouth/Throat:      Mouth: Mucous membranes are moist.      Pharynx: No oropharyngeal exudate or posterior oropharyngeal erythema.   Eyes:      General: Lids are normal.      " Conjunctiva/sclera: Conjunctivae normal.      Pupils: Pupils are equal, round, and reactive to light.   Neck:      Thyroid: No thyromegaly.   Cardiovascular:      Rate and Rhythm: Normal rate and regular rhythm.      Heart sounds: Normal heart sounds. No murmur heard.     No friction rub.   Pulmonary:      Effort: Pulmonary effort is normal. No respiratory distress.      Breath sounds: Normal breath sounds. No wheezing or rales.   Abdominal:      General: Bowel sounds are normal. There is no distension.      Palpations: Abdomen is soft. There is no mass.      Tenderness: There is no abdominal tenderness. There is no guarding or rebound.   Musculoskeletal:      Right lower leg: No edema.      Left lower leg: No edema.   Skin:     General: Skin is warm and dry.   Neurological:      General: No focal deficit present.      Mental Status: He is alert.   Psychiatric:         Mood and Affect: Mood normal.         Speech: Speech normal.         Behavior: Behavior normal.        Good range of motion of the right knee.  Nontender.  No swelling noted.      Physical Exam      Result Review :            Other Results    Results  Laboratory Studies  A1c was 6.5 in June.             Assessment and Plan    Diagnoses and all orders for this visit:    1. Essential hypertension (Primary)  -     Comprehensive Metabolic Panel  -     lisinopril (PRINIVIL,ZESTRIL) 10 MG tablet; Take 1 tablet by mouth Daily.  Dispense: 90 tablet; Refill: 1    2. Type 2 diabetes mellitus without complication, without long-term current use of insulin  -     Comprehensive Metabolic Panel  -     Hemoglobin A1c    3. Right knee pain, unspecified chronicity               DISCUSSION    Assessment & Plan  1. Hypertension.  A 90-day supply of lisinopril has been prescribed.  Blood pressure is well-controlled.  No changes in medication.    2. Borderline diabetes.  The patient's A1c was recorded at 6.5 in 06/2023. Laboratory tests will be conducted  today.    Follow-up in 6 months.    Call if the knee pain worsens or changes.    Follow-up  The patient is scheduled for a follow-up visit in 6 months.        Follow Up   Return in about 6 months (around 11/20/2024).    Patient was given instructions and counseling regarding his condition or for health maintenance advice. Please see specific information pulled into the AVS if appropriate.       Ilia Marx MD    Patient or patient representative verbalized consent for the use of Ambient Listening during the visit with  Ilia Marx MD for chart documentation. 5/20/2024  16:28 EDT

## 2024-05-21 LAB
ALBUMIN SERPL-MCNC: 4.6 G/DL (ref 3.8–4.9)
ALBUMIN/GLOB SERPL: 2.1 {RATIO} (ref 1.2–2.2)
ALP SERPL-CCNC: 77 IU/L (ref 44–121)
ALT SERPL-CCNC: 17 IU/L (ref 0–44)
AST SERPL-CCNC: 15 IU/L (ref 0–40)
BILIRUB SERPL-MCNC: 0.3 MG/DL (ref 0–1.2)
BUN SERPL-MCNC: 12 MG/DL (ref 6–24)
BUN/CREAT SERPL: 13 (ref 9–20)
CALCIUM SERPL-MCNC: 9.6 MG/DL (ref 8.7–10.2)
CHLORIDE SERPL-SCNC: 105 MMOL/L (ref 96–106)
CO2 SERPL-SCNC: 23 MMOL/L (ref 20–29)
CREAT SERPL-MCNC: 0.96 MG/DL (ref 0.76–1.27)
EGFRCR SERPLBLD CKD-EPI 2021: 92 ML/MIN/1.73
GLOBULIN SER CALC-MCNC: 2.2 G/DL (ref 1.5–4.5)
GLUCOSE SERPL-MCNC: 112 MG/DL (ref 70–99)
HBA1C MFR BLD: 6.8 % (ref 4.8–5.6)
Lab: NORMAL
POTASSIUM SERPL-SCNC: 4.1 MMOL/L (ref 3.5–5.2)
PROT SERPL-MCNC: 6.8 G/DL (ref 6–8.5)
SODIUM SERPL-SCNC: 140 MMOL/L (ref 134–144)

## 2024-05-23 DIAGNOSIS — E11.9 TYPE 2 DIABETES MELLITUS WITHOUT COMPLICATION, WITHOUT LONG-TERM CURRENT USE OF INSULIN: Primary | ICD-10-CM

## 2024-05-23 RX ORDER — ROSUVASTATIN CALCIUM 5 MG/1
5 TABLET, COATED ORAL DAILY
Qty: 90 TABLET | Refills: 1 | Status: SHIPPED | OUTPATIENT
Start: 2024-05-23

## 2024-06-07 ENCOUNTER — TELEPHONE (OUTPATIENT)
Dept: FAMILY MEDICINE CLINIC | Facility: CLINIC | Age: 59
End: 2024-06-07
Payer: COMMERCIAL

## 2024-06-07 RX ORDER — BENZONATATE 100 MG/1
100 CAPSULE ORAL 3 TIMES DAILY PRN
Qty: 30 CAPSULE | Refills: 1 | Status: SHIPPED | OUTPATIENT
Start: 2024-06-07

## 2024-06-07 NOTE — TELEPHONE ENCOUNTER
Caller: PaulHattie    Relationship: Emergency Contact    Best call back number: [phone]    What medication are you requesting: COUGH MEDICINE    What are your current symptoms: COUGHING ALMOST TO THE POINT OF VOMITING    How long have you been experiencing symptoms: 3 DAYS    Have you had these symptoms before:    [] Yes  [x] No    Have you been treated for these symptoms before:   [] Yes  [x] No    If a prescription is needed, what is your preferred pharmacy and phone number: [id number] - [facility] - [place], [place] - [address] AT [facility] [phone] [facility] [phone] FX     Additional notes: THEY'VE ALECASSANDRAA AND THAI'S 45 AND NEITHER HAVE HELPED. IT'S LIKE A TICKLE IN HIS THROAT THAT WON'T GO AWAY.

## 2024-06-07 NOTE — TELEPHONE ENCOUNTER
Patient stated he has had this Dry cough for about 3 days or so, stated he gets a tickle in his throat and he starts coughing. Not Productive. Not feeling bad, no fever. Just the cough

## 2024-08-07 ENCOUNTER — OFFICE VISIT (OUTPATIENT)
Dept: FAMILY MEDICINE CLINIC | Facility: CLINIC | Age: 59
End: 2024-08-07
Payer: COMMERCIAL

## 2024-08-07 ENCOUNTER — HOSPITAL ENCOUNTER (OUTPATIENT)
Dept: GENERAL RADIOLOGY | Facility: HOSPITAL | Age: 59
Discharge: HOME | End: 2024-08-07
Admitting: NURSE PRACTITIONER
Payer: COMMERCIAL

## 2024-08-07 VITALS
TEMPERATURE: 97.8 F | SYSTOLIC BLOOD PRESSURE: 126 MMHG | HEIGHT: 70 IN | OXYGEN SATURATION: 99 % | DIASTOLIC BLOOD PRESSURE: 78 MMHG | WEIGHT: 262.2 LBS | BODY MASS INDEX: 37.54 KG/M2 | RESPIRATION RATE: 14 BRPM | HEART RATE: 72 BPM

## 2024-08-07 DIAGNOSIS — M25.561 ACUTE PAIN OF RIGHT KNEE: Primary | ICD-10-CM

## 2024-08-07 DIAGNOSIS — M25.561 ACUTE PAIN OF RIGHT KNEE: ICD-10-CM

## 2024-08-07 PROCEDURE — 99213 OFFICE O/P EST LOW 20 MIN: CPT | Performed by: NURSE PRACTITIONER

## 2024-08-07 PROCEDURE — 73560 X-RAY EXAM OF KNEE 1 OR 2: CPT

## 2024-08-07 RX ORDER — NAPROXEN 500 MG/1
500 TABLET ORAL 2 TIMES DAILY WITH MEALS
Qty: 20 TABLET | Refills: 0 | Status: SHIPPED | OUTPATIENT
Start: 2024-08-07 | End: 2024-08-17

## 2024-08-07 NOTE — PROGRESS NOTES
Date: 2024   Patient Name: Audie Paul  : 1965   MRN: [id number]     Chief Complaint:    Chief Complaint   Patient presents with    Knee Pain     Rt side for 2-3 weeks. No known injury.       History of Present Illness: Audie Paul is a 59 y.o. male who is here today for Right knee pain for the last 2-3 weeks  Denies any known injuries, no overuse or unusual activities.  Pulling sensation of the knee when walking down the stairs  Recently played Tennis with daughter and since it has worsened  Will take advil on occasion with moderate relief of symptoms.  Braces are uncomfortable  Pain is worse when getting up from a seated positive but improves with movement.     Knee Pain              Review of Systems:   Review of Systems   Musculoskeletal:  Positive for arthralgias (right knee).       Past Medical History:   Past Medical History:   Diagnosis Date    Benign hypertension     Lumbar pain     Lumbar Back Pain       Past Surgical History:   Past Surgical History:   Procedure Laterality Date    KNEE SURGERY Left     skin graft       Family History:   Family History   Problem Relation Age of Onset    Pancreatic cancer Brother     Heart disease Other         ischemic heart disease    No Known Problems Mother     Heart disease Father        Social History:   Social History     Socioeconomic History    Marital status:    Tobacco Use    Smoking status: Never    Smokeless tobacco: Never   Vaping Use    Vaping status: Never Used   Substance and Sexual Activity    Alcohol use: Not Currently     Comment: social    Drug use: No    Sexual activity: Yes     Partners: Female     Comment:        Medications:     Current Outpatient Medications:     lisinopril (PRINIVIL,ZESTRIL) 10 MG tablet, Take 1 tablet by mouth Daily., Disp: 90 tablet, Rfl: 1    rosuvastatin (Crestor) 5 MG tablet, Take 1 tablet by mouth Daily., Disp: 90 tablet, Rfl: 1    naproxen (Naprosyn) 500 MG tablet, Take 1 tablet by  "mouth 2 (Two) Times a Day With Meals for 10 days., Disp: 20 tablet, Rfl: 0    Allergies:   Allergies   Allergen Reactions    No Known Drug Allergy          Physical Exam:  Vital Signs:   Vitals:    08/07/24 1231   BP: 126/78   Pulse: 72   Resp: 14   Temp: 97.8 °F (36.6 °C)   SpO2: 99%   Weight: 119 kg (262 lb 3.2 oz)   Height: 177.8 cm (70\")     Body mass index is 37.62 kg/m².     Physical Exam  Vitals and nursing note reviewed.   Constitutional:       Appearance: Normal appearance.   HENT:      Head: Normocephalic and atraumatic.   Cardiovascular:      Rate and Rhythm: Normal rate and regular rhythm.   Pulmonary:      Effort: Pulmonary effort is normal.      Breath sounds: Normal breath sounds.   Abdominal:      General: Bowel sounds are normal.   Musculoskeletal:      Right knee: Swelling and effusion present. Decreased range of motion. Tenderness present.      Instability Tests: Anterior drawer test negative. Posterior drawer test negative.      Left knee: Normal.      Instability Tests: Anterior drawer test negative. Posterior drawer test negative.   Skin:     General: Skin is warm.   Neurological:      General: No focal deficit present.      Mental Status: He is alert and oriented to person, place, and time.   Psychiatric:         Mood and Affect: Mood normal.           Assessment/Plan:   Diagnoses and all orders for this visit:    1. Acute pain of right knee (Primary)  -     XR Knee 1 or 2 View Right; Future  -     naproxen (Naprosyn) 500 MG tablet; Take 1 tablet by mouth 2 (Two) Times a Day With Meals for 10 days.  Dispense: 20 tablet; Refill: 0       Stable in clinic today  Xray ordered  Naproxen PRN in plan   Tylenol arthritis PRN  Ice therapy   Brace for stability     Follow Up:   Return in about 2 weeks (around 8/21/2024), or if symptoms worsen or fail to improve.    Mendy Woody. OLGA LIDIA   [facility]   "

## 2024-10-01 ENCOUNTER — TELEPHONE (OUTPATIENT)
Dept: FAMILY MEDICINE CLINIC | Facility: CLINIC | Age: 59
End: 2024-10-01
Payer: COMMERCIAL

## 2024-10-01 NOTE — TELEPHONE ENCOUNTER
Called patient to reschedule 11/22/24 appt as Dr. Marx will be out of the office. No answer; left vm to call back.    **HUB okay to relay and reschedule**

## 2024-12-16 ENCOUNTER — TELEPHONE (OUTPATIENT)
Dept: FAMILY MEDICINE CLINIC | Facility: CLINIC | Age: 59
End: 2024-12-16
Payer: COMMERCIAL

## 2024-12-16 DIAGNOSIS — E11.9 TYPE 2 DIABETES MELLITUS WITHOUT COMPLICATION, WITHOUT LONG-TERM CURRENT USE OF INSULIN: ICD-10-CM

## 2024-12-16 RX ORDER — OSELTAMIVIR PHOSPHATE 75 MG/1
75 CAPSULE ORAL DAILY
Qty: 10 CAPSULE | Refills: 0 | Status: SHIPPED | OUTPATIENT
Start: 2024-12-16

## 2024-12-16 NOTE — TELEPHONE ENCOUNTER
DAUGHTER WAS SEEN IN THE OFFICE TODAY TESTED POSITIVE FOR FLU. DR LUNDY WANTED A REQUEST TO START THE PARENTS ON TAMIFLU ONCE A DAY

## 2024-12-17 RX ORDER — ROSUVASTATIN CALCIUM 5 MG/1
5 TABLET, COATED ORAL DAILY
Qty: 90 TABLET | Refills: 0 | Status: SHIPPED | OUTPATIENT
Start: 2024-12-17

## 2025-01-12 DIAGNOSIS — I10 ESSENTIAL HYPERTENSION: ICD-10-CM

## 2025-01-13 RX ORDER — LISINOPRIL 10 MG/1
10 TABLET ORAL DAILY
Qty: 90 TABLET | Refills: 0 | Status: SHIPPED | OUTPATIENT
Start: 2025-01-13

## 2025-03-15 DIAGNOSIS — E11.9 TYPE 2 DIABETES MELLITUS WITHOUT COMPLICATION, WITHOUT LONG-TERM CURRENT USE OF INSULIN: ICD-10-CM

## 2025-03-17 RX ORDER — ROSUVASTATIN CALCIUM 5 MG/1
5 TABLET, COATED ORAL DAILY
Qty: 90 TABLET | Refills: 0 | Status: SHIPPED | OUTPATIENT
Start: 2025-03-17

## 2025-04-15 DIAGNOSIS — I10 ESSENTIAL HYPERTENSION: ICD-10-CM

## 2025-04-16 RX ORDER — LISINOPRIL 10 MG/1
10 TABLET ORAL DAILY
Qty: 90 TABLET | Refills: 0 | Status: SHIPPED | OUTPATIENT
Start: 2025-04-16

## 2025-06-28 DIAGNOSIS — E11.9 TYPE 2 DIABETES MELLITUS WITHOUT COMPLICATION, WITHOUT LONG-TERM CURRENT USE OF INSULIN: ICD-10-CM

## 2025-06-30 RX ORDER — ROSUVASTATIN CALCIUM 5 MG/1
5 TABLET, COATED ORAL DAILY
Qty: 30 TABLET | Refills: 0 | Status: SHIPPED | OUTPATIENT
Start: 2025-06-30

## 2025-07-24 DIAGNOSIS — E11.9 TYPE 2 DIABETES MELLITUS WITHOUT COMPLICATION, WITHOUT LONG-TERM CURRENT USE OF INSULIN: ICD-10-CM

## 2025-07-24 DIAGNOSIS — I10 ESSENTIAL HYPERTENSION: ICD-10-CM

## 2025-07-25 RX ORDER — LISINOPRIL 10 MG/1
10 TABLET ORAL DAILY
Qty: 30 TABLET | Refills: 0 | Status: SHIPPED | OUTPATIENT
Start: 2025-07-25

## 2025-07-25 RX ORDER — ROSUVASTATIN CALCIUM 5 MG/1
5 TABLET, COATED ORAL DAILY
Qty: 30 TABLET | Refills: 0 | Status: SHIPPED | OUTPATIENT
Start: 2025-07-25

## 2025-08-23 ENCOUNTER — TELEPHONE (OUTPATIENT)
Dept: FAMILY MEDICINE CLINIC | Facility: CLINIC | Age: 60
End: 2025-08-23
Payer: COMMERCIAL

## 2025-08-23 DIAGNOSIS — I10 ESSENTIAL HYPERTENSION: ICD-10-CM

## 2025-08-23 DIAGNOSIS — E11.9 TYPE 2 DIABETES MELLITUS WITHOUT COMPLICATION, WITHOUT LONG-TERM CURRENT USE OF INSULIN: ICD-10-CM

## 2025-08-25 RX ORDER — ROSUVASTATIN CALCIUM 5 MG/1
5 TABLET, COATED ORAL DAILY
Qty: 30 TABLET | Refills: 0 | OUTPATIENT
Start: 2025-08-25

## 2025-08-25 RX ORDER — LISINOPRIL 10 MG/1
10 TABLET ORAL DAILY
Qty: 30 TABLET | Refills: 0 | OUTPATIENT
Start: 2025-08-25

## 2025-08-27 DIAGNOSIS — E11.9 TYPE 2 DIABETES MELLITUS WITHOUT COMPLICATION, WITHOUT LONG-TERM CURRENT USE OF INSULIN: ICD-10-CM

## 2025-08-27 DIAGNOSIS — I10 ESSENTIAL HYPERTENSION: ICD-10-CM

## 2025-08-27 RX ORDER — LISINOPRIL 10 MG/1
10 TABLET ORAL DAILY
Qty: 30 TABLET | Refills: 0 | OUTPATIENT
Start: 2025-08-27

## 2025-08-27 RX ORDER — ROSUVASTATIN CALCIUM 5 MG/1
5 TABLET, COATED ORAL DAILY
Qty: 30 TABLET | Refills: 0 | OUTPATIENT
Start: 2025-08-27

## 2025-08-28 RX ORDER — LISINOPRIL 10 MG/1
10 TABLET ORAL DAILY
Qty: 30 TABLET | Refills: 0 | Status: SHIPPED | OUTPATIENT
Start: 2025-08-28

## 2025-08-28 RX ORDER — ROSUVASTATIN CALCIUM 5 MG/1
5 TABLET, COATED ORAL DAILY
Qty: 30 TABLET | Refills: 0 | Status: SHIPPED | OUTPATIENT
Start: 2025-08-28